# Patient Record
Sex: MALE | Race: WHITE
[De-identification: names, ages, dates, MRNs, and addresses within clinical notes are randomized per-mention and may not be internally consistent; named-entity substitution may affect disease eponyms.]

---

## 2019-06-01 ENCOUNTER — HOSPITAL ENCOUNTER (EMERGENCY)
Dept: HOSPITAL 25 - UCCORT | Age: 39
Discharge: HOME | End: 2019-06-01
Payer: COMMERCIAL

## 2019-06-01 VITALS — DIASTOLIC BLOOD PRESSURE: 71 MMHG | SYSTOLIC BLOOD PRESSURE: 113 MMHG

## 2019-06-01 DIAGNOSIS — F17.210: ICD-10-CM

## 2019-06-01 DIAGNOSIS — J03.90: Primary | ICD-10-CM

## 2019-06-01 PROCEDURE — 99202 OFFICE O/P NEW SF 15 MIN: CPT

## 2019-06-01 PROCEDURE — 87070 CULTURE OTHR SPECIMN AEROBIC: CPT

## 2019-06-01 PROCEDURE — G0463 HOSPITAL OUTPT CLINIC VISIT: HCPCS

## 2019-06-01 PROCEDURE — 87651 STREP A DNA AMP PROBE: CPT

## 2019-06-01 NOTE — UC
Throat Pain/Nasal Yair HPI





- HPI Summary


HPI Summary: 


Per RN triage: "ONSET TWO DAYS AGO WITH FATIUGE, HEADACHES, CHILLS,FEELS 

FEVERISH.  SORE THROAT. DENIES COUGH. NAUSEA, NO VOMITING OR DIARRHEA. PT'S 

KIDS RECENTLY TREATED FOR STREP."


-ST feels like shards of glass. + sweating. took ibuprofen before coming in. 





- History of Current Complaint


Chief Complaint: UCRespiratory


Stated Complaint: SORE THROAT


Time Seen by Provider: 06/01/19 10:59


Pain Intensity: 6





- Allergies/Home Medications


Allergies/Adverse Reactions: 


 Allergies











Allergy/AdvReac Type Severity Reaction Status Date / Time


 


codeine Allergy Unknown vomiting , Verified 06/01/19 10:15





   sweats  











Home Medications: 


 Home Medications





Dm/Acetaminophen/Doxylamine [Vicks Nyquil Cold-Flu Liquid] 1 liq PO PRN 06/01/ 19 [History]


Ibuprofen TAB* [Advil TAB*] 600 mg PO Q6H PRN 06/01/19 [History Confirmed 06/01/ 19]











PMH/Surg Hx/FS Hx/Imm Hx


Previously Healthy: Yes





- Surgical History


Surgical History: Yes


Surgery Procedure, Year, and Place: VASECTOMY





- Family History


Known Family History: Positive: Hypertension





- Social History


Alcohol Use: Occasionally


Substance Use Type: None


Smoking Status (MU): Current Some Day Smoker





Review of Systems


All Other Systems Reviewed And Are Negative: Yes


Constitutional: Positive: Fever, Chills, Fatigue


Skin: Negative: Rash


Eyes: Positive: Negative


ENT: Positive: Sore Throat.  Negative: Ear Ache, Nasal Discharge, Sinus 

Congestion, Sinus Pain/Tenderness


Respiratory: Positive: Negative.  Negative: Shortness Of Breath, Cough


Cardiovascular: Positive: Negative


Gastrointestinal: Positive: Negative


Genitourinary: Positive: Negative


Motor: Positive: Negative


Neurovascular: Positive: Negative


Musculoskeletal: Positive: Negative


Neurological: Positive: Negative


Psychological: Positive: Negative


Is Patient Immunocompromised?: No





Physical Exam


Triage Information Reviewed: Yes


Appearance: Ill-Appearing - visible perspiration.


Vital Signs: 


 Initial Vital Signs











Temp  97.4 F   06/01/19 10:16


 


Pulse  90   06/01/19 10:16


 


Resp  16   06/01/19 10:16


 


BP  113/71   06/01/19 10:16


 


Pulse Ox  97   06/01/19 10:16











Vital Signs Reviewed: Yes


Eye Exam: Normal


Eyes: Positive: Conjunctiva Clear


ENT: Positive: Pharyngeal erythema, TMs normal, Tonsillar exudate, Uvula 

midline.  Negative: Nasal congestion, Nasal drainage, TM bulging, TM dull, TM 

red, Hoarse voice - no hot potato voice, Sinus tenderness


Dental Exam: Normal


Neck exam: Normal


Neck: Positive: Supple, Tenderness @, Enlarged Nodes @ - b/l cervical anterior 

LAD, tender..  Negative: Nuchal Rigidity


Respiratory Exam: Normal


Respiratory: Positive: Lungs clear, Normal breath sounds, No respiratory 

distress, No accessory muscle use.  Negative: Crackles, Rhonchi, Stridor, 

Wheezing


Cardiovascular Exam: Normal


Cardiovascular: Positive: RRR


Abdominal Exam: Normal


Musculoskeletal Exam: Normal


Neurological Exam: Normal


Psychological Exam: Normal


Skin Exam: Normal





Throat Pain/Nasal Course/Dx





- Course


Course Of Treatment: 


see below for details


-rapid strep neg. 


-thropat cx ordered bc systemic sx. could still  be viral. can't r/o abscess. 


-treat w/ clinda, probiotic, prednisone and ENT f/u. prefers to f/u locally w/ 

specified ENT. 


-to ER w/ worsening sx. 


-needs to push fluids. has had at least 16 oz water today. will cont to sip and 

increase intake,


-reliable historian. 





- Differential Dx/Diagnosis


Differential Diagnosis/HQI/PQRI: Peritonsillar Abscess, Pharyngitis, Tonsillitis

, URI


Provider Diagnosis: 


 Tonsillitis








Discharge





- Sign-Out/Discharge


Documenting (check all that apply): Patient Departure


All imaging exams completed and their final reports reviewed: No Studies





- Discharge Plan


Condition: Stable


Disposition: HOME


Prescriptions: 


Clindamycin Cap(NF) [Clindamycin Cap 300 mg Cap(NF)] 300 mg PO Q6H 10 Days #40 

cap


predniSONE TAB* [Deltasone 20 MG TAB*] 40 mg PO DAILY 5 Days #5 tab


Patient Education Materials:  Tonsillitis (ED)


Referrals: 


Caterina Hirsch MD [Medical Doctor] - 7 Days


Karson Murphy MD [Medical Doctor] - 3 Days


No Primary Care Phys,NOPCP [Primary Care Provider] - 


Additional Instructions: 


-Make sure to take a probiotic daily while on antibiotics to help prevent a 

potential complication of antibiotic use called c diff. Some well known brands 

that can be found OTC are [employer] and [facility].  Make sure to 

complete the entire prescription unless advised otherwise by your health care 

provider. 


-The rapid strep test is negative. I have sent off a throat culture on you in 

case this is a false negative test result. Those results will take 2-3 days to 

return.  I am concerned b/c of the degree of your symptoms are more consistent 

with a bacterial infection, but could also be due to a virus. There is a 

possibility of an abscess behind your tonsils that is not visible without 

equipment used by an ENT specialist. We discussed treating you aggressively in 

case there is an abscess. This treatment would also be effective for strep 

throat. 


-If your symptoms worsen, you are not able to reduce the fever with OTC meds, 

you start drooling or are unable to swallow, please go to the ER immediately. 


-We discussed risks of prednisone including but not limited to anxiety, 

agitation, insomnia, GI upset, elevated blood pressures and blood sugar readings

, adrenal crisis and avascular necrosis of the hip.





- Billing Disposition and Condition


Condition: STABLE


Disposition: Home

## 2019-09-09 ENCOUNTER — HOSPITAL ENCOUNTER (EMERGENCY)
Dept: HOSPITAL 25 - UCCORT | Age: 39
Discharge: HOME | End: 2019-09-09
Payer: COMMERCIAL

## 2019-09-09 VITALS — DIASTOLIC BLOOD PRESSURE: 73 MMHG | SYSTOLIC BLOOD PRESSURE: 122 MMHG

## 2019-09-09 DIAGNOSIS — Z87.891: ICD-10-CM

## 2019-09-09 DIAGNOSIS — M72.2: Primary | ICD-10-CM

## 2019-09-09 PROCEDURE — 99212 OFFICE O/P EST SF 10 MIN: CPT

## 2019-09-09 PROCEDURE — G0463 HOSPITAL OUTPT CLINIC VISIT: HCPCS

## 2019-09-09 NOTE — ED
Lower Extremity





- HPI Summary


HPI Summary: 





38 yr old male with the complaint of left foot pain.  Onset yesterday when 

playing soccer.  He was running and felt a pain on the bottom of the foot.  he 

has had plantar fascititis in the past.  The pain got worse when went to bed 

and hurt more when got up and put weight on it. His pain is moderate, and 

associated with mild STS over the medial arch area.  No heel pain.  No pain on 

ball of foot.   No other complaints. 





- History of Current Complaint


Chief Complaint: UCLowerExtremity


Stated Complaint: LEFT FOOT INJURY


Time Seen by Provider: 09/09/19 09:17


Pain Intensity: 7





- Allergies/Home Medications


Allergies/Adverse Reactions: 


 Allergies











Allergy/AdvReac Type Severity Reaction Status Date / Time


 


codeine Allergy Unknown vomiting , Verified 09/09/19 09:22





   sweats  














PMH/Surg Hx/FS Hx/Imm Hx


Endocrine/Hematology History: 


   Denies: Hx Diabetes, Hx Thyroid Disease


Cardiovascular History: 


   Denies: Hx Hypertension


Respiratory History: 


   Denies: Hx Asthma, Hx Chronic Obstructive Pulmonary Disease (COPD)


GI History: 


   Denies: Hx Ulcer





- Surgical History


Surgery Procedure, Year, and Place: VASECTOMY


Infectious Disease History: No


Infectious Disease History: 


   Denies: Hx Hepatitis, Hx Human Immunodeficiency Virus (HIV), Traveled 

Outside the [place] in Last 30 Days





- Family History


Known Family History: Positive: Hypertension





- Social History


Occupation: Employed Full-time


Alcohol Use: Occasionally


Substance Use Type: Reports: None


Smoking Status (MU): Former Smoker





Review of Systems


Constitutional: Negative


Positive: Other - left foot pain


All Other Systems Reviewed And Are Negative: Yes





Physical Exam


Triage Information Reviewed: Yes


Vital Signs On Initial Exam: 


 Initial Vitals











Temp Pulse Resp BP Pulse Ox


 


 97.7 F   50   18   122/73   100 


 


 09/09/19 09:16  09/09/19 09:16  09/09/19 09:16  09/09/19 09:16  09/09/19 09:16











Vital Signs Reviewed: Yes


Appearance: Positive: Well-Appearing, No Pain Distress


Skin: Positive: Warm, Skin Color Reflects Adequate Perfusion


Head/Face: Positive: Normal Head/Face Inspection


Eyes: Positive: EOMI


ENT: Positive: Normal ENT inspection


Respiratory/Lung Sounds: Positive: Other - normal effort


Cardiovascular: Positive: Pulses are Symmetrical in both Upper and Lower 

Extremities - normal DP and PT pulse


Abdomen Description: Negative: Distended


Musculoskeletal: Positive: Strength/ROM Intact, Other - left foot:There is mild 

STS medial arch, no bruise.  There is mild tenderness over the medial arch as 

well.  no laceration, no abrasions.  Non tender over the achilles tendon.


Neurological: Positive: Alert, Oriented to Person Place, Time, CN Intact II-III

, Speech Normal


Psychiatric: Positive: Normal





Diagnostics





- Vital Signs


 Vital Signs











  Temp Pulse Resp BP Pulse Ox


 


 09/09/19 09:16  97.7 F  50  18  122/73  100














- Laboratory


Lab Statement: Any lab studies that have been ordered have been reviewed, and 

results considered in the medical decision making process.





- Radiology


  ** left foot


Radiology Interpretation Completed By: Radiologist - nad





Lower Extremity Course/Dx





- Course


Course Of Treatment: 38 yr old with plantar fascia strain.   DC home. Referral 

to ortho and podiatry.  crutches.  non weight bear.





- Diagnoses


Provider Diagnoses: 


 Plantar fasciitis of left foot








Discharge ED





- Sign-Out/Discharge


Documenting (check all that apply): Patient Departure


All imaging exams completed and their final reports reviewed: Yes





- Discharge Plan


Condition: Good


Disposition: HOME


Prescriptions: 


Ibuprofen TAB* [Motrin TAB* 800 MG] 800 mg PO ONCE PRN #20 tab


 PRN Reason: Pain - Moderate


Patient Education Materials:  Plantar Fasciitis (ED)


Referrals: 


No Primary Care Phys,NOPCP [Primary Care Provider] - 


Magdiel Pool DPM [Doctor of Podiatric Medicine] - 1 Day


Leobardo Moran MD [Medical Doctor] - 1 Day





- Billing Disposition and Condition


Condition: GOOD


Disposition: Home